# Patient Record
(demographics unavailable — no encounter records)

---

## 2017-01-26 PROBLEM — E03.4 HYPOTHYROIDISM DUE TO ACQUIRED ATROPHY OF THYROID: Status: ACTIVE | Noted: 2017-01-26

## 2017-01-26 PROBLEM — F33.1 MODERATE EPISODE OF RECURRENT MAJOR DEPRESSIVE DISORDER (HCC): Status: ACTIVE | Noted: 2017-01-26

## 2017-02-07 DIAGNOSIS — S76.211A GROIN STRAIN, RIGHT, INITIAL ENCOUNTER: ICD-10-CM

## 2017-02-07 NOTE — PROGRESS NOTES
Phone call to patient, detailed voice message left, x-ray ordered for right hip and informed patient to go get an x-ray if groin pain is still persistent with deep ache.